# Patient Record
Sex: FEMALE | Race: WHITE
[De-identification: names, ages, dates, MRNs, and addresses within clinical notes are randomized per-mention and may not be internally consistent; named-entity substitution may affect disease eponyms.]

---

## 2019-08-28 ENCOUNTER — HOSPITAL ENCOUNTER (OUTPATIENT)
Dept: HOSPITAL 25 - OREAST | Age: 69
Discharge: HOME | End: 2019-08-28
Attending: SPECIALIST
Payer: MEDICARE

## 2019-08-28 VITALS — SYSTOLIC BLOOD PRESSURE: 136 MMHG | DIASTOLIC BLOOD PRESSURE: 69 MMHG

## 2019-08-28 DIAGNOSIS — E78.00: ICD-10-CM

## 2019-08-28 DIAGNOSIS — H25.811: Primary | ICD-10-CM

## 2019-08-28 DIAGNOSIS — H40.1131: ICD-10-CM

## 2019-08-28 DIAGNOSIS — I10: ICD-10-CM

## 2019-08-28 DIAGNOSIS — H40.033: ICD-10-CM

## 2019-08-28 DIAGNOSIS — H52.201: ICD-10-CM

## 2019-08-28 DIAGNOSIS — M06.9: ICD-10-CM

## 2019-08-28 PROCEDURE — V2632 POST CHMBR INTRAOCULAR LENS: HCPCS

## 2019-08-28 PROCEDURE — C1783 OCULAR IMP, AQUEOUS DRAIN DE: HCPCS

## 2019-08-28 NOTE — OP
OPERATIVE NOTE:

 

DATE OF OPERATION:  08/28/19

 

DATE OF BIRTH:  [birth year]

 

SURGEON:  Zay Tapia M.D.

 

PREOPERATIVE DIAGNOSIS:  Cataract and glaucoma, right eye.

 

POSTOPERATIVE DIAGNOSIS:  Cataract and glaucoma, right eye.

 

OPERATIVE PROCEDURE:  Extracapsular cataract extraction with intraocular lens implant and iStent, rig
ht eye.

 

PROCEDURE:  The patient was brought to the operating room after being given 1/2% Alcaine with epineph
rine drops in the preoperative area.  The eye was prepped and draped in the usual sterile fashion.  S
terile drape and eyelid speculum were placed.  Again, topical 1/2% Alcaine with epinephrine was given
.  A paracentesis incision was made at the 9 o'clock position with the No.75 blade.  Clear cornea inc
ision 2.2 x 2.2-mm was created at the 12 o'clock position starting at the anterior limbus using the 2
.2-mm keratome.  The anterior chamber was irrigated with 0.4 mL of 1% non-preservative intracameral l
idocaine and filled with DisCoVisc.  A capsulorrhexis was completed using the cystotome and the Utrat
a forceps. Hydrodissection was performed with balanced salt solution.  The lens nucleus was removed w
ith the Phacoemulsification handpiece without incident.  Cortex was removed with the irrigation-aspir
ation handpiece.  The capsular bag was re-inflated using DisCoVisc and an [id number] implant was inser
arlet with the shooter followed by an iStent inject placed at the 2 and 4 o'clock position with the brendan
oter.  The irrigation-aspiration handpiece was used to remove all residual DisCoVisc.  The eye was re
filled with balanced salt solution and the wound checked and found to be watertight.  Topical Maxitro
l drops were given.

 

 [id number]/CPS #: [id number]

## 2019-11-03 ENCOUNTER — HOSPITAL ENCOUNTER (EMERGENCY)
Dept: HOSPITAL 25 - ED | Age: 69
Discharge: HOME | End: 2019-11-03
Payer: MEDICARE

## 2019-11-03 VITALS — SYSTOLIC BLOOD PRESSURE: 144 MMHG | DIASTOLIC BLOOD PRESSURE: 88 MMHG

## 2019-11-03 DIAGNOSIS — J32.9: Primary | ICD-10-CM

## 2019-11-03 DIAGNOSIS — I10: ICD-10-CM

## 2019-11-03 PROCEDURE — 99282 EMERGENCY DEPT VISIT SF MDM: CPT

## 2019-11-03 PROCEDURE — 70486 CT MAXILLOFACIAL W/O DYE: CPT

## 2019-11-03 NOTE — ED
Neurological HPI





- HPI Summary


HPI Summary: 





Patient is a 68 y/o F presenting to the ED for a chief complaint of right-sided 

facial numbness and pain. Patient was sent to [facility] from [facility] for possible 

[place] Palsy or CVA.  Patient reports she has swelling on the right side of the 

face near the right eye, right-sided jaw pain, and right-sided dental pain. 

Patient previously believed she had a sinus infection due to nasal congestion 

and rhinorrhea. Patient denies pain while moving the eyes. Patient denies any 

aggravating or alleviating factors. Patient recently had an eye surgery in 

August 2019 for a PMHx of glaucoma. 





- History of Current Complaint


Chief Complaint: EDNeurologicalDeficit


Stated Complaint: RIGHT SIDE FACIAL NUMBNESS AND PAIN PER PT


Time Seen by Provider: 11/03/19 12:17


Hx Obtained From: Patient


Onset/Duration: Sudden Onset, Still Present


Timing: Sudden Onset


Onset Severity: Moderate


Current Severity: Moderate


Neurological Deficit Location: Facial


Headache Location: Diffuse (Right)


Pain Intensity: 2


Pain Scale Used: 0-10 Numeric


Character: Numbness/Tingling - Right-sided facial numbness, Other: - Positive 

right-sided facial swelling near right eye, right-sided jaw pain, right-sided 

dental pain, nasal congestion, rhinorrhea; negative pain while moving eyes


Aggravating: Nothing


Alleviating: Nothing


Associated Signs and Symptoms: Positive: Pain - Right-sided jaw and dental pain

, Numbness - Right-sided facial numbness





- Allergy/Home Medications


Allergies/Adverse Reactions: 


 Allergies











Allergy/AdvReac Type Severity Reaction Status Date / Time


 


No Known Allergies Allergy   Verified 08/28/19 07:08














PMH/Surg Hx/FS Hx/Imm Hx


Previously Healthy: Yes


Endocrine/Hematology History: 


   Denies: Hx Bone Marrow Disease, Hx Sickle Cell Disease, Hx Anemia


Cardiovascular History: Reports: Hx Hypertension


Musculoskeletal History: Reports: Hx Arthritis - OSTEO AND RA, Other 

Musculoskeletal History - BILATERAL TKR 2008


Sensory History: Reports: Hx Cataracts - BILATERAL, Hx Contacts or Glasses - 

GLASSES, Hx Glaucoma


   Denies: Hx Legally Blind, Hx Deafness, Hx Hearing Aid


Opthamlomology History: Reports: Hx Cataracts - BILATERAL, Hx Contacts or 

Glasses - GLASSES, Hx Glaucoma


   Denies: Hx Legally Blind


EENT History: 


   Denies: Hx Deafness





- Cancer History


Hx Chemotherapy: No


Hx Radiation Therapy: No





- Surgical History


Surgical History: Yes


Surgery Procedure, Year, and Place: RIGHT BREAST CA LUMPECTOMY 1966.  BILAT TKR 

2008 [place]


Hx Anesthesia Reactions: No


Infectious Disease History: No


Infectious Disease History: 


   Denies: Traveled Outside the [place] in Last 30 Days





- Family History


Known Family History: 


   Negative: Cardiac Disease





- Social History


Occupation: Retired


Lives: With Family


Alcohol Use: None


Hx Substance Use: No


Substance Use Type: Reports: None


Hx Tobacco Use: No


Smoking Status (MU): Never Smoked Tobacco


Have You Smoked in the Last Year: No





Review of Systems


Positive: Other - Negative pain while moving the eyes


Positive: Dental Pain - Right-sided, Nasal Discharge, Other - Positive right-

sided jaw pain and nasal congestion


Positive: Myalgia - Right-sided facial, Edema - Right side of the face near the 

right eye


Positive: Numbness


All Other Systems Reviewed And Are Negative: Yes





Physical Exam





- Summary


Physical Exam Summary: 





Appearance: The patient is well-nourished in no acute distress and in no acute 

pain.





Skin: The skin is warm and dry, and skin color reflects adequate perfusion.





HEENT: The head is normocephalic and atraumatic. The pupils are equal and 

reactive. The conjunctivae are clear and without drainage. Nares are patent and 

without drainage. Mouth reveals moist mucous membranes, and the throat is 

without erythema and exudate. The external ears are intact. The ear canals are 

patent and without drainage. The tympanic membranes are intact. Mild 

infraorbital swelling with mild tenderness, mild erythema, at rest there is 

some loss of the facial highlights on the right side, but patient can move 

voluntarily. 





Neck: The neck is supple with full range of motion and non-tender. There are no 

carotid bruits. There is no neck vein distension.





Respiratory: Chest is non-tender. Lungs are clear to auscultation and breath 

sounds are symmetrical and equal.





Cardiovascular: Heart is regular rate and rhythm. There is no murmur or rub 

auscultated. There is no peripheral edema and pulses are symmetrical and equal.





Abdomen: The abdomen is soft and non-tender. There are normal bowel sounds 

heard in all four quadrants and there is no organomegaly palpated.





Musculoskeletal: There is no back tenderness noted. Extremities are non-tender 

with full range of motion. There is good capillary refill. There is no 

peripheral edema or calf tenderness elicited.





Neurological: Patient is alert and oriented to person, place and time. The 

patient has symmetrical motor strength in all four extremities. Cranial nerves 

are grossly intact. Deep tendon reflexes are symmetrical and equal in all four 

extremities.





Psychiatric: The patient has an appropriate affect and does not exhibit any 

anxiety or depression.


Triage Information Reviewed: Yes


Vital Signs On Initial Exam: 


 Initial Vitals











Temp Pulse Resp BP Pulse Ox


 


 98.5 F   60   16   182/71   98 


 


 11/03/19 12:01  11/03/19 12:01  11/03/19 12:01  11/03/19 12:01  11/03/19 12:01











Vital Signs Reviewed: Yes





Procedures





- Sedation


Patient Received Moderate/Deep Sedation with Procedure: No





Diagnostics





- Vital Signs


 Vital Signs











  Temp Pulse Resp BP Pulse Ox


 


 11/03/19 12:01  98.5 F  60  16  182/71  98














- Laboratory


Lab Statement: Any lab studies that have been ordered have been reviewed, and 

results considered in the medical decision making process.





- CT


  ** Maxillofacial CT


CT Interpretation Completed By: Radiologist


Summary of CT Findings: Maxillofacial CT IMPRESSION:  1.  No maxillofacial 

fracture is identified.  2.  Mild bilateral maxillary sinus mucosal disease.  

Reviewed by ED physician.





Course/Dx





- Course


Course Of Treatment: Ms. Lara was sent over from [facility] with a concern for 

right facial droop Bell's palsy versus stroke.  Ms. Lara reports feeling she's 

had sinusitis for a few days with facial pain and now some swelling.  She was 

nontoxic in appearance with stable vitals.  On exam she had some mild swelling 

infraorbital area which partially obscured the lines of her face and related 

appears as though it was drooping.  She could move it well and there were no 

other focal neurological findings.  She had a friend with her in the department 

who felt that her face looked normal to him.  Maxillofacial CT did show 

sinusitis and I'm going to treat her with antibiotics and antihistamine/

decongestant.





- Diagnoses


Provider Diagnoses: 


 Sinusitis








Discharge ED





- Sign-Out/Discharge


Documenting (check all that apply): Patient Departure - Discharge





- Discharge Plan


Condition: Stable


Disposition: HOME


Prescriptions: 


Amoxicillin/Clavulanate TAB* [Augmentin *] 875 mg PO BID #20 tab


Fexofenadine/Pseudoephedrine [Allegra-D 24 Hour Tablet] 1 each PO DAILY #10 

tab.er.24h MDD 1


Patient Education Materials:  Sinusitis (ED)


Referrals: 


Manjinder House MD [Primary Care Provider] - 


Additional Instructions: 


Follow up with your primary care provider if not improved in 2-3 days. Return 

to the Emergency Department for new or worsening symptoms. 





- Billing Disposition and Condition


Condition: STABLE


Disposition: Home





- Attestation Statements


Document Initiated by Scribe: Yes


Documenting Scribe: Karine Mathur


Provider For Whom Scribe is Documenting (Include Credential): Caleb Adams MD


Scribe Attestation: 


Karine ROMAN, scribed for Caleb Adams MD on 11/03/19 at 1553. 


Scribe Documentation Reviewed: Yes


Provider Attestation: 


The documentation as recorded by the Karine hernandez accurately reflects 

the service I personally performed and the decisions made by me, Caleb Adams MD


Status of Scribe Document: Viewed

## 2019-11-03 NOTE — XMS REPORT
Continuity of Care Document (CCD)

 Created on:2019



Patient:Charlotte Lara

Sex:Female

:1950

External Reference #:[id number]





Demographics







 Address  [address] [place] [address]

 

 Home Phone  [phone]

 

 Mobile Phone  [phone]

 

 Preferred Language  en

 

 Marital Status  Not  or 

 

 Sabianist Affiliation  Unknown

 

 Race  White

 

 Ethnic Group  Not  or 









Author







 Name  Zay Tapia M.D.

 

 Address  [address] [place] [address]









Care Team Providers







 Name  Role  Phone

 

 Manjinder House M.D. - Family Medicine  Care Team Information   [phone]
[phone]

 

 Tomas Gaston O.D. - Optometrist  Care Team Information   
Unavailable









Problems







 Active Problems  Provider  Date

 

 Essential hypertension    Onset: 

 

 Glaucoma    Onset: 

 

 Hypercholesterolemia    Onset: 

 

 Bilateral primary open angle glaucoma  Zay Tapia M.D.  Onset: 2018

 

 Combined form of senile cataract  Zay Tapia M.D.  Onset: 2018

 

 History of herpes zoster    Onset: 









 Note: (HAD 3 OCCURRENCES OF SHINGLES)









 Myopia  Jes Lei O.D.  Onset: 2019

 

 Regular astigmatism  Jes Lei O.D.  Onset: 2019

 

 Presbyopia  Jes Lei O.D.  Onset: 2019

 

 Tear film insufficiency  Zay Tapia M.D.  Onset: 2019

 

 Superficial punctate keratitis  Sveta Jhaveri O.D.  Onset: 2019

 

 Chalazion  Sveta Jhaveri O.D.  Onset: 2019

 

 Presence of intraocular lens  Zay Tapia M.D.  Onset: 2019

 

 Bilateral narrow angle of anterior chamber  Zay Tapia M.D.  Onset: 



 of eyes    







Social History







 Type  Date  Description  Comments

 

 Birth Sex    Unknown  

 

 ETOH Use    Denies alcohol use  

 

 Tobacco Use  Start: Unknown  Patient has never smoked  

 

 Recreational Drug Use    Denies Drug Use  

 

 Smoking Status  Reviewed: 19  Patient has never smoked  







Allergies, Adverse Reactions, Alerts







 Active Allergies  Reaction  Severity  Comments  Date

 

 NKDA        2018

 

 Hay Fever        2018

 

 Pollen        2018







Medications







 Active Medications  SIG  Qnty  Indications  Ordering Provider  Date

 

 Travatan Z  1 drop both eyes  15ml  H40.1131  Zay Tapia,  2019



         0.004%  every night      M.D.  



 Solution          



           

 

 Timolol Maleate  One drop in both  15ml  H40.1131  Zay Tapia,  2019



              0.5%  eyes every      M.D.  



 (Daily) Solution  morning        



           

 

 Simvastatin        Midura, Manjinder M.D.  



          40mg Tablets          



           

 

 Atenolol        Midura, Manjinder M.D.  



       50mg Tablets          



           

 

 Triamterene/Hydrochlor        Midura, Manjinder M.D.  



 othiazide          



        37.5-25mg          



 Tablets          



           

 

 Calcium 500/Vitamin D  1 Tab PO Daily      Unknown  



           



 Tablets          



           

 

 Ervin Aspirin Ec Low        Unknown  



 Dose          



   81mg Tablets DR          



           

 

 Cool Compress  as Needed      Zay Tapia,  



         M.DKatia  

 

 Soothe XP  1 drop both eyes      Zay Tapia,  



         Solution  every hour      M.D.  



           









 History Medications









 Art[facility] T[facility]  [facility]



                1.4%  day      Shakila O.DKatia  2019



 Solution          



           

 

 Warm Compresses  every hour      Sveta J.  2019 -



         Shakila O.DKatia  2019

 

 Erythromycin  apply thin strip  1Tube  H00.15  Sveta J.  2019 -



            5mg/GM  to all four      Shakila O.JA  2019



 Ointment  eyelid margins x        



   every night for 2        



   weeks        

 

 Ciprofloxacin HCL  instill one drop  10units    Zay Tapia,  2019 -



                 0.3%  in the left eye      M.D.  2019



 Solution  three times a        



   day, start the        



   day before        



   surgery        

 

 Ketorolac  use one drop in  10ml    Zay Tapia,  2019 -



 Tromethamine  the left eye      M.D.  2019



            0.5%  three times a        



 Solution  day, start the        



   day before        



   surgery        

 

 Prednisolone Acetate  1 drops left eye  10ml    Zay Tapia,  2019 -



                    1%  three times a      M.D.  2019



 Suspension  day. taper as        



   directed        







Immunizations







 Description

 

 No Information Available







Vital Signs







 Date  Vital  Result  Comment

 

 2018  2:12pm  BP Systolic  125 mmHg  









 BP Diastolic  70 mmHg  

 

 Heart Rate  78 /min  

 

 Respiratory Rate  15 /min  









 2018  2:10pm  BP Systolic  142 mmHg  









 BP Diastolic  72 mmHg  

 

 Heart Rate  66 /min  

 

 Respiratory Rate  16 /min  







Results







 Description

 

 No Information Available







Procedures







 Date  Code  Description  Status

 

 2019  60262  Contact Lens For Treatment Of Ocular Surface Disease  
Completed

 

 2019  22770  Extracapsular Cataract Extraction W/Intraocular Lens  
Completed

 

 2019  0191T  I-Stent Aqueous Drainage Device  Completed

 

 2019  13157  Extracapsular Cataract Extraction W/Intraocular Lens  
Completed

 

 2019  0191T  I-Stent Aqueous Drainage Device  Completed

 

 2019  59540  Ophthalmic Biometry  Completed

 

 2019  33269  Ophthalmic Biometry  Completed

 

 2019  66789  Est Patient Intermediate Exam  Completed

 

 2019  79241  Gonioscopy  Completed

 

 2019  59328  Est Patient Intermediate Exam  Completed

 

 2019  55247  Scanning Computerized Ophthalmic Diagnostic Imag Posterior  
Completed



     Seg On  

 

 2019  80161  Visual Field Exam Extended  Completed

 

 2019  99078  Est Patient Comprehensive Exam  Completed







Medical Devices







 Description

 

 No Information Available







Encounters







 Type  Date  Location  Provider  Dx  Diagnosis

 

 Office Visit  2019  Sveta Henderson  H16.143  Punctate



   8:15a  MD, pc  TENNILLE Jhaveri    keratitis,



           bilateral









 H04.123  Dry eye syndrome of bilateral lacrimal glands

 

 Z96.1  Presence of intraocular lens

 

 H40.1131  Primary open-angle glaucoma, bilateral, mild stage







Assessments







 Date  Code  Description  Provider

 

 2019  H16.143  Punctate keratitis, bilateral  Zay Tapia M.D.

 

 2019  H04.123  Dry eye syndrome of bilateral lacrimal  Zay Tapia M.D.



     glands  

 

 2019  Z96.1  Presence of intraocular lens  Zay Tapia M.D.

 

 2019  H16.143  Punctate keratitis, bilateral  Sveta Jhaveri O.D.

 

 2019  H04.123  Dry eye syndrome of bilateral lacrimal  Sveta Jhaveri O.D.



     glands  

 

 2019  Z96.1  Presence of intraocular lens  Sveta Jhaveri O.D.

 

 2019  H40.1131  Primary open-angle glaucoma, bilateral,  Sveta Jhaveri O.D.



     mild stage  

 

 2019  Z96.1  Presence of intraocular lens  Sveta Jhaveri O.D.

 

 2019  H40.1131  Primary open-angle glaucoma, bilateral,  Sveta Jhaveri O.D.



     mild stage  

 

 2019  H00.15  Chalazion left lower eyelid  Sveta Jhaveri O.D.

 

 2019  H16.142  Punctate keratitis, left eye  Sveta Jhaveri O.D.

 

 2019  Z96.1  Presence of intraocular lens  Zay Tapia M.D.

 

 2019  H40.033  Anatomical narrow angle, bilateral  Zay Tapia M.D.

 

 2019  H40.1131  Primary open-angle glaucoma, bilateral,  Zay Tapia M.D.



     mild stage  

 

 2019  H25.811  Combined forms of age-related cataract,  Zay Tapia M.D.



     right eye  

 

 2019  H40.1111  Primary open-angle glaucoma, right eye,  Zay Tapia M.D.



     mild stage  

 

 2019  H25.811  Combined forms of age-related cataract,  Zay Tapia M.D.



     right eye  

 

 2019  H40.1131  Primary open-angle glaucoma, bilateral,  Zay Tapia M.D.



     mild stage  

 

 2019  H40.033  Anatomical narrow angle, bilateral  Zay Tapia M.D.

 

 2019  Z96.1  Presence of intraocular lens  Zay Tapia M.D.

 

 2019  H25.812  Combined forms of age-related cataract,  Zay Tapia M.D.



     left eye  

 

 2019  H40.1121  Primary open-angle glaucoma, left eye,  Zay Tapia M.D.



     mild stage  

 

 2019  H25.812  Combined forms of age-related cataract,  Zay Tapia M.D.



     left eye  

 

 2019  H25.812  Combined forms of age-related cataract,  Zay Tapia M.D.



     left eye  

 

 2019  H40.1131  Primary open-angle glaucoma, bilateral,  Zay Tapia M.D.



     mild stage  

 

 2019  H40.033  Anatomical narrow angle, bilateral  Zay Tapia M.D.

 

 2019  H25.811  Combined forms of age-related cataract,  Zay Tapia M.D.



     right eye  

 

 2019  H40.1131  Primary open-angle glaucoma, bilateral,  Zay Tapia M.D.



     mild stage  

 

 2019  H40.033  Anatomical narrow angle, bilateral  Zay Tapia M.D.

 

 2019  H25.813  Combined forms of age-related cataract,  Zay Tapia M.D.



     bilateral  

 

 2019  H40.1131  Primary open-angle glaucoma, bilateral,  Zay Tapia M.D.



     mild stage  

 

 2019  H25.813  Combined forms of age-related cataract,  Zay Tapia M.D.



     bilateral  







Plan of Treatment

Future Appointment(s):2020  8:45 am - Zay Tapia M.D. at Zay Tapia MD, 2019 - Zay Tapia M.D.H16.143 Punctate keratitis, 
bilateralComments:Smoking can increase the risk of developing or worsening any 
eye related disease, as well as affect your overall health.  If you are a smoker
, we strongly recommend that you quit.If you are not a smoker, we strongly 
recommend that you do not start.  DISCONTINUE:Travatan Z  Timolol MaleateBEGIN 
USING SYSTANE SEVERAL TIMES A DAY AND REFRESH PM AT BEDTIMEFollow up:
.123 Dry eye syndrome of bilateral lacrimal glandsComments:Both of your 
eyes appear to be dry. Use artificial tears as directed.  You can use the tears 
more often if you are reading a book or are on the computer, as we tend to 
blink less, making our eyes dry out more.[facility] offers a few 
items in our optical department to help alleviate dry eye symptoms. Systane and 
Refresh are good brands of tears you can use.  You can pick these up at any 
pharmacy and they do not require a prescription.Z96.1 Presence of intraocular 
lensComments:The artificial lens implants in both eyes appear to be stable at 
this time.



Functional Status







 Description

 

 No Information Available







Mental Status







 Description

 

 No Information Available







Referrals







 Description

 

 No Information Available

## 2019-11-03 NOTE — XMS REPORT
Continuity of Care Document (CCD)

 Created on:2019



Patient:Charlotte Lara

Sex:Female

:1950

External Reference #:[id number]





Demographics







 Address  [address] [place] [address]

 

 Home Phone  [phone]

 

 Mobile Phone  [phone]

 

 Preferred Language  en

 

 Marital Status  Not  or 

 

 Restorationist Affiliation  Unknown

 

 Race  White

 

 Ethnic Group  Not  or 









Author







 Name  Sveta Jhaveri O.D.

 

 Address  [address] [place] [address]









Care Team Providers







 Name  Role  Phone

 

 Manjinder House M.D. - Family Medicine  Care Team Information   [phone]
[phone]

 

 Tomas Gaston O.D. - Optometrist  Care Team Information   
Unavailable









Problems







 Active Problems  Provider  Date

 

 Essential hypertension    Onset: 

 

 Glaucoma    Onset: 

 

 Hypercholesterolemia    Onset: 

 

 Bilateral primary open angle glaucoma  Zay Tapia M.D.  Onset: 2018

 

 Combined form of senile cataract  Zay Tapia M.D.  Onset: 2018

 

 History of herpes zoster    Onset: 









 Note: (HAD 3 OCCURRENCES OF SHINGLES)









 Myopia  Jes Lei O.D.  Onset: 2019

 

 Regular astigmatism  Jes Lei O.D.  Onset: 2019

 

 Presbyopia  Jes Lei O.D.  Onset: 2019

 

 Superficial punctate keratitis  Sveta Jhaveri O.D.  Onset: 2019

 

 Chalazion  Sveta Jhaveri O.D.  Onset: 2019

 

 Presence of intraocular lens  Zay Tapia M.D.  Onset: 2019

 

 Bilateral narrow angle of anterior chamber  Zay Tapia M.D.  Onset: 



 of eyes    







Social History







 Type  Date  Description  Comments

 

 Birth Sex    Unknown  

 

 ETOH Use    Denies alcohol use  

 

 Tobacco Use  Start: Unknown  Patient has never smoked  

 

 Recreational Drug Use    Denies Drug Use  

 

 Smoking Status  Reviewed: 19  Patient has never smoked  







Allergies, Adverse Reactions, Alerts







 Active Allergies  Reaction  Severity  Comments  Date

 

 NKDA        2018

 

 Hay Fever        2018

 

 Pollen        2018







Medications







 Active Medications  SIG  Qnty  Indications  Ordering  Date



         Provider  

 

 Artificial Tears  throughout the day      Sveta MCDONALD  2019



                1.4%        TENNILLE Jhaveri  



 Solution          



           

 

 Warm Compresses  every hour      Sveta MCDONALD  2019



         TENNILLE Jhaveri  

 

 Erythromycin  apply thin strip to  1Tube  H00.15  Sveta MCDONALD  2019



            5mg/GM  all four eyelid      TENNILLE Jhaveri  



 Ointment  margins x every        



   night for 2 weeks        

 

 Travatan Z  1 drop both eyes  15ml  H40.1131  Zay Tapia,  2019



          0.004%  every night      M.DKatia  



 Solution          



           

 

 Timolol Maleate  One drop in both  15ml  H40.1131  Zay Tapia,  2019



               0.5%  eyes every morning      M.D.  



 (Daily) Solution          



           

 

 Simvastatin        Midura, Manjinder  



           40mg        M.D.  



 Tablets          



           

 

 Atenolol        Midura, Manjinder  



        50mg Tablets        M.D.  



           

 

 Triamterene/Hydrochlo        Midura, Manjinder  



 rothiazide        M.D.  



          37.5-25mg          



 Tablets          



           

 

 Calcium 500/Vitamin D  1 Tab PO Daily      Unknown  



           



 Tablets          



           

 

 Ervin Aspirin Ec Low        Unknown  



 Dose          



    81mg Tablets DR          



           









 History Medications









 Ciprofloxacin HCL  instill one drop  10units    Zay MCDONALD  2019 -



               0.3%  in the left eye      IRMA Tapia  2019



 Solution  three times a day,        



   start the day        



   before surgery        

 

 Ketorolac Tromethamine  use one drop in  10ml    Zay MCDONALD  2019 -



                    0.5%  the left eye three      IRMA Tapia  2019



 Solution  times a day, start        



   the day before        



   surgery        

 

 Prednisolone Acetate  1 drops left eye  10ml    Zay MCDONALD  2019 -



                  1%  three times a day.      IRMA Tapia  2019



 Suspension  taper as directed        



           







Immunizations







 Description

 

 No Information Available







Vital Signs







 Date  Vital  Result  Comment

 

 2018  2:12pm  BP Systolic  125 mmHg  









 BP Diastolic  70 mmHg  

 

 Heart Rate  78 /min  

 

 Respiratory Rate  15 /min  









 2018  2:10pm  BP Systolic  142 mmHg  









 BP Diastolic  72 mmHg  

 

 Heart Rate  66 /min  

 

 Respiratory Rate  16 /min  







Results







 Description

 

 No Information Available







Procedures







 Date  Code  Description  Status

 

 2019  76948  Extracapsular Cataract Extraction W/Intraocular Lens  
Completed

 

 2019  0191T  I-Stent Aqueous Drainage Device  Completed

 

 2019  40013  Extracapsular Cataract Extraction W/Intraocular Lens  
Completed

 

 2019  0191T  I-Stent Aqueous Drainage Device  Completed

 

 2019  32985  Ophthalmic Biometry  Completed

 

 2019  22200  Ophthalmic Biometry  Completed

 

 2019  40100  Est Patient Intermediate Exam  Completed

 

 2019  66435  Gonioscopy  Completed

 

 2019  71058  Est Patient Intermediate Exam  Completed

 

 2019  92959  Scanning Computerized Ophthalmic Diagnostic Imag Posterior  
Completed



     Seg On  

 

 2019  23917  Visual Field Exam Extended  Completed

 

 2019  02382  Est Patient Comprehensive Exam  Completed







Medical Devices







 Description

 

 No Information Available







Encounters







 Description

 

 No Information Available







Assessments







 Date  Code  Description  Provider

 

 2019  H16.143  Punctate keratitis, bilateral  Sveta Jhaveri O.D.

 

 2019  H04.123  Dry eye syndrome of bilateral lacrimal  Sveta Jhaveri O.D.



     glands  

 

 2019  Z96.1  Presence of intraocular lens  Sveta Jhaveri O.D.

 

 2019  H40.1131  Primary open-angle glaucoma, bilateral,  Sveta Jhaveri O.D.



     mild stage  

 

 2019  Z96.1  Presence of intraocular lens  Sveta Jhaveri O.D.

 

 2019  H40.1131  Primary open-angle glaucoma, bilateral,  Sveta Jhaveri O.D.



     mild stage  

 

 2019  H00.15  Chalazion left lower eyelid  Sveta Jhaveri O.D.

 

 2019  H16.142  Punctate keratitis, left eye  Sveta Jhaveri O.D.

 

 2019  Z96.1  Presence of intraocular lens  Zay Tapia M.D.

 

 2019  H40.033  Anatomical narrow angle, bilateral  Zay Tapia M.D.

 

 2019  H40.1131  Primary open-angle glaucoma, bilateral,  Zay Tapia M.D.



     mild stage  

 

 2019  H25.811  Combined forms of age-related cataract,  Zay Tapia M.D.



     right eye  

 

 2019  H40.1111  Primary open-angle glaucoma, right eye,  Zay Tapia M.D.



     mild stage  

 

 2019  H25.811  Combined forms of age-related cataract,  Zay Tapia M.D.



     right eye  

 

 2019  H40.1131  Primary open-angle glaucoma, bilateral,  Zay Tapia M.D.



     mild stage  

 

 2019  H40.033  Anatomical narrow angle, bilateral  Zay Tapia M.D.

 

 2019  Z96.1  Presence of intraocular lens  Zay Tapia M.D.

 

 2019  H25.812  Combined forms of age-related cataract,  Zay Tapia M.D.



     left eye  

 

 2019  H40.1121  Primary open-angle glaucoma, left eye,  Zay Tapia M.D.



     mild stage  

 

 2019  H25.812  Combined forms of age-related cataract,  Zay Tapia M.D.



     left eye  

 

 2019  H25.812  Combined forms of age-related cataract,  Zay Tapia M.D.



     left eye  

 

 2019  H40.1131  Primary open-angle glaucoma, bilateral,  Zay Tapia M.D.



     mild stage  

 

 2019  H40.033  Anatomical narrow angle, bilateral  Zay Tapia M.D.

 

 2019  H25.811  Combined forms of age-related cataract,  Zay Tapia M.D.



     right eye  

 

 2019  H40.1131  Primary open-angle glaucoma, bilateral,  Zay Tapia M.D.



     mild stage  

 

 2019  H40.033  Anatomical narrow angle, bilateral  Zay Tapia M.D.

 

 2019  H25.813  Combined forms of age-related cataract,  Zay Tapia M.D.



     bilateral  

 

 2019  H40.1131  Primary open-angle glaucoma, bilateral,  Zay Tapia M.D.



     mild stage  

 

 2019  H25.813  Combined forms of age-related cataract,  Zay Tapia M.D.



     bilateral  







Plan of Treatment

Future Appointment(s):2020  8:45 am - [facility] Tap[facility] M.D. at [facility] - Sveta Jhaveri O.D.H16.143 Punctate keratitis, 
bilateralComments:continue artificial tears both eyes every hour preservative 
free cold compress for comfortFollow up:1 Day Follow up with RA04.123 Dry eye 
syndrome of bilateral lacrimal phtfpuP73.1 Presence of intraocular lensH40.1131 
Primary open-angle glaucoma, bilateral, mild stage



Functional Status







 Description

 

 No Information Available







Mental Status







 Description

 

 No Information Available







Referrals







 Description

 

 No Information Available

## 2019-11-03 NOTE — XMS REPORT
Continuity of Care Document (CCD)

 Created on:2019



Patient:Charlotte Lara

Sex:Female

:1950

External Reference #:[id number]





Demographics







 Address  [address] [place] [address]

 

 Home Phone  [phone]

 

 Mobile Phone  [phone]

 

 Preferred Language  en

 

 Marital Status  Not  or 

 

 Sabianism Affiliation  Unknown

 

 Race  White

 

 Ethnic Group  Not  or 









Author







 Name  Sveta Jhaveri O.D.

 

 Address  [address] [place] [address]









Care Team Providers







 Name  Role  Phone

 

 Manjinder House M.D. - Family Medicine  Care Team Information   [phone]
[phone]

 

 Tomas Gaston O.D. - Optometrist  Care Team Information   
Unavailable









Problems







 Active Problems  Provider  Date

 

 Essential hypertension    Onset: 

 

 Glaucoma    Onset: 

 

 Hypercholesterolemia    Onset: 

 

 Bilateral primary open angle glaucoma  Zay Tapia M.D.  Onset: 2018

 

 Combined form of senile cataract  Zay Tapia M.D.  Onset: 2018

 

 History of herpes zoster    Onset: 









 Note: (HAD 3 OCCURRENCES OF SHINGLES)









 Myopia  Jes Lei O.D.  Onset: 2019

 

 Regular astigmatism  Jes Lei O.D.  Onset: 2019

 

 Presbyopia  Jes Lei O.D.  Onset: 2019

 

 Superficial punctate keratitis  Sveta Jhaveri O.D.  Onset: 2019

 

 Chalazion  Sveta Jhaveri O.D.  Onset: 2019

 

 Presence of intraocular lens  Zay Tapia M.D.  Onset: 2019

 

 Bilateral narrow angle of anterior chamber  Zay Tapia M.D.  Onset: 



 of eyes    







Social History







 Type  Date  Description  Comments

 

 Birth Sex    Unknown  

 

 ETOH Use    Denies alcohol use  

 

 Tobacco Use  Start: Unknown  Patient has never smoked  

 

 Recreational Drug Use    Denies Drug Use  

 

 Smoking Status  Reviewed: 19  Patient has never smoked  







Allergies, Adverse Reactions, Alerts







 Active Allergies  Reaction  Severity  Comments  Date

 

 NKDA        2018

 

 Hay Fever        2018

 

 Pollen        2018







Medications







 Active Medications  SIG  Qnty  Indications  Ordering Provider  Date

 

 Erythromycin  apply thin strip  1Tube  H00.15  Sveta MCDONALD  2019



           5mg/GM  to all four      Stockwin, O.D.  



 Ointment  eyelid margins x        



   every night for 2        



   weeks        

 

 Ketorolac Tromethamine  use one drop in  10ml    Zay Tapia,  2019



   the left eye      M.D.  



 0.5% Solution  three times a        



   day, start the        



   day before        



   surgery        

 

 Prednisolone Acetate  1 drops left eye  10ml    Zay Tapia,  2019



                   1%  three times a      M.D.  



 Suspension  day. taper as        



   directed        

 

 Travatan Z  1 drop both eyes  15ml  H40.1131  Zay Tapia,  2019



         0.004%  every night      M.D.  



 Solution          



           

 

 Timolol Maleate  One drop in both  15ml  H40.1131  Zay Tapia,  2019



              0.5%  eyes every      M.D.  



 (Daily) Solution  morning        



           

 

 Simvastatin        Midura, Manjinder M.D.  



          40mg Tablets          



           

 

 Atenolol        Midura, Manjinder M.D.  



       50mg Tablets          



           

 

 Triamterene/Hydrochlor        Midura, Manjinder M.D.  



 othiazide          



        37.5-25mg          



 Tablets          



           

 

 Calcium 500/Vitamin D  1 Tab PO Daily      Unknown  



           



 Tablets          



           

 

 Ervin Aspirin Ec Low        Unknown  



 Dose          



   81mg Tablets DR          



           









 History Medications









 Ciprofloxacin HCL  instill one drop  10units    Zay Tapia,  2019 -



               0.3%  in the left eye      M.D.  2019



 Solution  three times a        



   day, start the        



   day before        



   surgery        







Immunizations







 Description

 

 No Information Available







Vital Signs







 Date  Vital  Result  Comment

 

 2018  2:12pm  BP Systolic  125 mmHg  









 BP Diastolic  70 mmHg  

 

 Heart Rate  78 /min  

 

 Respiratory Rate  15 /min  









 2018  2:10pm  BP Systolic  142 mmHg  









 BP Diastolic  72 mmHg  

 

 Heart Rate  66 /min  

 

 Respiratory Rate  16 /min  







Results







 Description

 

 No Information Available







Procedures







 Date  Code  Description  Status

 

 2019  41150  Extracapsular Cataract Extraction W/Intraocular Lens  
Completed

 

 2019  0191T  I-Stent Aqueous Drainage Device  Completed

 

 2019  40074  Extracapsular Cataract Extraction W/Intraocular Lens  
Completed

 

 2019  0191T  I-Stent Aqueous Drainage Device  Completed

 

 2019  60419  Ophthalmic Biometry  Completed

 

 2019  74163  Ophthalmic Biometry  Completed

 

 2019  99693  Est Patient Intermediate Exam  Completed

 

 2019  93229  Gonioscopy  Completed

 

 2019  59008  Est Patient Intermediate Exam  Completed

 

 2019  13621  Scanning Computerized Ophthalmic Diagnostic Imag Posterior  
Completed



     Seg On  

 

 2019  82885  Visual Field Exam Extended  Completed

 

 2019  19027  Est Patient Comprehensive Exam  Completed







Medical Devices







 Description

 

 No Information Available







Encounters







 Description

 

 No Information Available







Assessments







 Date  Code  Description  Provider

 

 2019  Z96.1  Presence of intraocular lens  Sveta Jhaveri O.D.

 

 2019  H40.1131  Primary open-angle glaucoma, bilateral,  Sveta Jhaveri O.D.



     mild stage  

 

 2019  H00.15  Chalazion left lower eyelid  Sveta Jhaveri O.D.

 

 2019  H16.142  Punctate keratitis, left eye  Sveta Jhaveri O.D.

 

 2019  Z96.1  Presence of intraocular lens  Zay Tapia M.D.

 

 2019  H40.033  Anatomical narrow angle, bilateral  Zay Tapia M.D.

 

 2019  H40.1131  Primary open-angle glaucoma, bilateral,  Zay Tapia M.D.



     mild stage  

 

 2019  H25.811  Combined forms of age-related cataract,  Zay Tapia M.D.



     right eye  

 

 2019  H40.1111  Primary open-angle glaucoma, right eye,  Zay Tapia M.D.



     mild stage  

 

 2019  H25.811  Combined forms of age-related cataract,  Zay Tapia M.D.



     right eye  

 

 2019  H40.1131  Primary open-angle glaucoma, bilateral,  Zay Tapia M.D.



     mild stage  

 

 2019  H40.033  Anatomical narrow angle, bilateral  Zay Tapia M.D.

 

 2019  Z96.1  Presence of intraocular lens  Zay Tapia M.D.

 

 2019  H25.812  Combined forms of age-related cataract,  Zay Tapia M.D.



     left eye  

 

 2019  H40.1121  Primary open-angle glaucoma, left eye,  Zay Tapia M.D.



     mild stage  

 

 2019  H25.812  Combined forms of age-related cataract,  Zay Tapia M.D.



     left eye  

 

 2019  H25.812  Combined forms of age-related cataract,  Zay Tapia M.D.



     left eye  

 

 2019  H40.1131  Primary open-angle glaucoma, bilateral,  Zay Tapia M.D.



     mild stage  

 

 2019  H40.033  Anatomical narrow angle, bilateral  Zay Tapia M.D.

 

 2019  H25.811  Combined forms of age-related cataract,  Zay Tapia M.D.



     right eye  

 

 2019  H40.1131  Primary open-angle glaucoma, bilateral,  Zay Tapia M.D.



     mild stage  

 

 2019  H40.033  Anatomical narrow angle, bilateral  Zay Tapia M.D.

 

 2019  H25.813  Combined forms of age-related cataract,  Zay Tapia M.D.



     bilateral  

 

 2019  H40.1131  Primary open-angle glaucoma, bilateral,  Zay Tapia M.D.



     mild stage  

 

 2019  H25.813  Combined forms of age-related cataract,  Zay Tapia M.D.



     bilateral  







Plan of Treatment

Future Appointment(s):2020  8:45 am - [facility] M.D. at [facility] - Sveta Jhaveri O.D.Z96.1 Presence of intraocular 
lensComments:Your lens implant looks stable in both eyes at this time.  You 
should be done, or almost done with your drops at this time according to your 
surgical calendar.  I have given you a prescription for glasses.  If you have 
any questions, please feel free to call our office at [phone].H40.1131 
Primary open-angle glaucoma, bilateral, mild stageComments:Your glaucoma is 
stable at this time.Your eye pressure is within an acceptable range, and your 
testing does not show any Glaucoma related changes at this time. Please 
continue your treatment.H00.15 Chalazion left lower eyelidNew Medication:
Erythromycin 5 mg/GM - apply thin strip to all four eyelid margins x every 
night for 2 weeksComments:use hot compresses for 5-10 minutes 3-4x dayuse 
ointment on all lids at bedtimeuse artificial tears at least 3-4 times a day 
.Follow up:1-2 Week Follow Up recheck and MR NO DROPS/ NO IOP  At your next 
visit, we are not planning to dilate your eyes. However, if you have any 
changes in your vision or new symptoms, there are certain situations that 
require us to dilate your eyes. If Dr. Jhaveri requests any additional testing
, that may require extra time. If you have any questions before your next 
appointment, please call our office [phone].Z05.054 Punctate keratitis
, left eye



Functional Status







 Description

 

 No Information Available







Mental Status







 Description

 

 No Information Available







Referrals







 Description

 

 No Information Available

## 2019-11-03 NOTE — XMS REPORT
Continuity of Care Document (CCD)

 Created on:2019



Patient:Charlotte Lara

Sex:Female

:1950

External Reference #:[id number]





Demographics







 Address  [address] [place] [id number]

 

 Home Phone  [phone]

 

 Mobile Phone  [phone]

 

 Preferred Language  en

 

 Marital Status  Not  or 

 

 Baptism Affiliation  Unknown

 

 Race  White

 

 Ethnic Group  Not  or 









Author







 Name  Zay Tapia M.D.

 

 Address  [address] [place] [address]









Care Team Providers







 Name  Role  Phone

 

 Manjinder House M.D. - Family Medicine  Care Team Information   [phone]
[phone]

 

 Tomas Gaston O.D. - Optometrist  Care Team Information   
Unavailable









Problems







 Active Problems  Provider  Date

 

 Essential hypertension    Onset: 

 

 Glaucoma    Onset: 

 

 Hypercholesterolemia    Onset: 

 

 Bilateral primary open angle glaucoma  Zay Tapia M.D.  Onset: 2018

 

 Combined form of senile cataract  Zay Tapia M.D.  Onset: 2018

 

 History of herpes zoster    Onset: 









 Note: (HAD 3 OCCURRENCES OF SHINGLES)









 Myopia  Jes Lei O.D.  Onset: 2019

 

 Regular astigmatism  Jes Lei O.D.  Onset: 2019

 

 Presbyopia  Jes Lei O.D.  Onset: 2019

 

 Tear film insufficiency  Zay Tapia M.D.  Onset: 2019

 

 Superficial punctate keratitis  Sveta Jhaveri O.D.  Onset: 2019

 

 Chalazion  Sveta Jhaveri O.D.  Onset: 2019

 

 Presence of intraocular lens  Zay Tapia M.D.  Onset: 2019

 

 Bilateral narrow angle of anterior chamber  Zay Tapia M.D.  Onset: 



 of eyes    







Social History







 Type  Date  Description  Comments

 

 Birth Sex    Unknown  

 

 ETOH Use    Denies alcohol use  

 

 Tobacco Use  Start: Unknown  Patient has never smoked  

 

 Recreational Drug Use    Denies Drug Use  

 

 Smoking Status  Reviewed: 19  Patient has never smoked  







Allergies, Adverse Reactions, Alerts







 Active Allergies  Reaction  Severity  Comments  Date

 

 NKDA        2018

 

 Hay Fever        2018

 

 Pollen        2018







Medications







 Active Medications  SIG  Qnty  Indications  Ordering  Date



         Provider  

 

 Refresh P.M.  every night at  10.5gm    Zay Tapia,  2019



   bedtime both eyes      M.D.  



 Ointment  in the morning as        



   needed        

 

 Systane  throughout the day      Zay Tapia,  2019



        0.4-0.3%        M.D.  



 Solution          



           

 

 Simvastatin        [facility]  



            40mg        M.D.  



 Tablets          



           

 

 Atenolol        [facility], Manjinder  



         50mg Tablets        M.D.  



           

 

 Triamterene/Hydrochl        [facility], Manjinder  



 orothiazide        M.D.  



            37.5-25mg          



 Tablets          



           

 

 Calcium 500/Vitamin  1 Tab PO Daily      Unknown  



 D          



   Tablets          



           

 

 Ervin Aspirin Ec Low        Unknown  



 Dose          



     81mg Tablets DR          



           

 

 Cool Compress  as Needed      Zay Tapia,  



         M.DKatia  









 History Medications









 Artificial Tears  throughout the      Sveta MCDONALD  2019 -



                1.4%  day      TENNILLE Jhaveri  2019



 Solution          



           

 

 Warm Compresses  every hour      Sveta MCDONALD  2019 -



         TENNILLE Jhaveri  2019

 

 Erythromycin  apply thin strip  1Tube  H00.15  Sveta MCDONALD  2019 -



            5mg/GM  to all four      TENNILLE Jhaveri  2019



 Ointment  eyelid margins x        



   every night for 2        



   weeks        

 

 Ciprofloxacin HCL  instill one drop  10units    Zay Tapia,  2019 -



                 0.3%  in the left eye      M.D.  2019



 Solution  three times a        



   day, start the        



   day before        



   surgery        

 

 Ketorolac  use one drop in  10ml    Zay Tapia,  2019 -



 Tromethamine  the left eye      M.D.  2019



            0.5%  three times a        



 Solution  day, start the        



   day before        



   surgery        

 

 Prednisolone Acetate  1 drops left eye  10ml    Zay Tapia,  2019 -



                    1%  three times a      M.D.  2019



 Suspension  day. taper as        



   directed        

 

 Travatan Z  1 drop both eyes  15ml  H40.113  Zay Tapia,  2019 -



          0.004%  every night    1  M.DKatia  2019



 Solution          



           







Immunizations







 Description

 

 No Information Available







Vital Signs







 Date  Vital  Result  Comment

 

 2018  2:12pm  BP Systolic  125 mmHg  









 BP Diastolic  70 mmHg  

 

 Heart Rate  78 /min  

 

 Respiratory Rate  15 /min  









 2018  2:10pm  BP Systolic  142 mmHg  









 BP Diastolic  72 mmHg  

 

 Heart Rate  66 /min  

 

 Respiratory Rate  16 /min  







Results







 Description

 

 No Information Available







Procedures







 Date  Code  Description  Status

 

 2019  30076  Contact Lens For Treatment Of Ocular Surface Disease  
Completed

 

 2019  13317  Extracapsular Cataract Extraction W/Intraocular Lens  
Completed

 

 2019  0191T  I-Stent Aqueous Drainage Device  Completed

 

 2019  62704  Extracapsular Cataract Extraction W/Intraocular Lens  
Completed

 

 2019  0191T  I-Stent Aqueous Drainage Device  Completed

 

 2019  89542  Ophthalmic Biometry  Completed

 

 2019  11038  Ophthalmic Biometry  Completed

 

 2019  17695  Est Patient Intermediate Exam  Completed

 

 2019  98903  Gonioscopy  Completed

 

 2019  19092  Est Patient Intermediate Exam  Completed

 

 2019  96835  Scanning Computerized Ophthalmic Diagnostic Imag Posterior  
Completed



     Seg On  

 

 2019  09315  Visual Field Exam Extended  Completed

 

 2019  03969  Est Patient Comprehensive Exam  Completed







Medical Devices







 Description

 

 No Information Available







Encounters







 Type  Date  Location  Provider  Dx  Diagnosis

 

 Office Visit  2019  Sveta Henderson  H16.143  Punctate



   8:15a  MD, pc  TENNILLE Jhaveri    keratitis,



           bilateral









 H04.123  Dry eye syndrome of bilateral lacrimal glands

 

 Z96.1  Presence of intraocular lens

 

 H40.1131  Primary open-angle glaucoma, bilateral, mild stage







Assessments







 Date  Code  Description  Provider

 

 2019  H16.143  Punctate keratitis, bilateral  Zay Tapia M.D.

 

 2019  H40.1131  Primary open-angle glaucoma, bilateral,  Zay Tapia M.D.



     mild stage  

 

 2019  Z96.1  Presence of intraocular lens  Zay Tapia M.D.

 

 2019  H16.143  Punctate keratitis, bilateral  Zay Tapia M.D.

 

 2019  H04.123  Dry eye syndrome of bilateral lacrimal  Zay Tapia M.D.



     glands  

 

 2019  Z96.1  Presence of intraocular lens  Zay Tapia M.D.

 

 2019  H16.143  Punctate keratitis, bilateral  VANDANA Lee.RYAN.

 

 2019  H04.123  Dry eye syndrome of bilateral lacrimal  Sveta Jhaveri O.D.



     glands  

 

 2019  Z96.1  Presence of intraocular lens  VANDANA Lee.JA

 

 2019  H40.1131  Primary open-angle glaucoma, bilateral,  Sveta Jhaveri O.D.



     mild stage  

 

 2019  Z96.1  Presence of intraocular lens  Sveta Jhaveri O.D.

 

 2019  H40.1131  Primary open-angle glaucoma, bilateral,  Sveta Jhaveri O.D.



     mild stage  

 

 2019  H00.15  Chalazion left lower eyelid  Sveta Jhaveri O.D.

 

 2019  H16.142  Punctate keratitis, left eye  VANDANA Lee.RYAN.

 

 2019  Z96.1  Presence of intraocular lens  Zay Tapia M.D.

 

 2019  H40.033  Anatomical narrow angle, bilateral  Zay Tapia M.D.

 

 2019  H40.1131  Primary open-angle glaucoma, bilateral,  Zay Tapia M.D.



     mild stage  

 

 2019  H25.811  Combined forms of age-related cataract,  Zay Tapia M.D.



     right eye  

 

 2019  H40.1111  Primary open-angle glaucoma, right eye,  Zay Tapia M.D.



     mild stage  

 

 2019  H25.811  Combined forms of age-related cataract,  Zay Tapia M.D.



     right eye  

 

 2019  H40.1131  Primary open-angle glaucoma, bilateral,  Zay Tapia M.D.



     mild stage  

 

 2019  H40.033  Anatomical narrow angle, bilateral  Zay Tapia M.D.

 

 2019  Z96.1  Presence of intraocular lens  Zay Tapia M.D.

 

 2019  H25.812  Combined forms of age-related cataract,  Zay Tapia M.D.



     left eye  

 

 2019  H40.1121  Primary open-angle glaucoma, left eye,  Zay Tapia M.D.



     mild stage  

 

 2019  H25.812  Combined forms of age-related cataract,  Zay Tapia M.D.



     left eye  

 

 2019  H25.812  Combined forms of age-related cataract,  Zay Tapia M.D.



     left eye  

 

 2019  H40.1131  Primary open-angle glaucoma, bilateral,  Zay Tapia M.D.



     mild stage  

 

 2019  H40.033  Anatomical narrow angle, bilateral  Zay Tapia M.D.

 

 2019  H25.811  Combined forms of age-related cataract,  Zay Tapia M.D.



     right eye  

 

 2019  H40.1131  Primary open-angle glaucoma, bilateral,  Zay Tapia M.D.



     mild stage  

 

 2019  H40.033  Anatomical narrow angle, bilateral  Zay Tapia M.D.

 

 2019  H25.813  Combined forms of age-related cataract,  Zay Tapia M.D.



     bilateral  

 

 2019  H40.1131  Primary open-angle glaucoma, bilateral,  Zay Tapia M.D.



     mild stage  

 

 2019  H25.813  Combined forms of age-related cataract,  Zay Tapia M.D.



     bilateral  







Plan of Treatment

Future Appointment(s):2020  8:45 am - Zay Tapia M.D. at [facility], 2019 - Zay Tapia M.D.H16.143 Punctate keratitis, 
bilateralComments:Smoking can increase the risk of developing or worsening any 
eye related disease, as well as affect your overall health.  If you are a smoker
, we strongly recommend that you quit.If you are not a smoker, we strongly 
recommend that you do not start.BEGIN TAKING 2000MG OF OMEGA 3'S DAILYCONTINUE 
HOT COMPRESSES, REFRESH PM AT NIGHT AND ARTIFICIAL TEARS FREQUENTLY THROUGHOUT 
THE DAYH40.1131 Primary open-angle glaucoma, bilateral, mild stageComments:Your 
glaucoma is stable at this time.Your eye pressure is within an acceptable range
, and your testing does not show any further deterioration at this time. Please 
continue your treatment.Follow up:1 Month Follow Up IOP Check   At your next 
visit, we are not planning to dilate your eyes. However, if you have any 
changes in your vision or new symptoms, there are certain situations that 
require us to dilate your eyes. If Dr. Tapia requests any additional testing, 
that may require extra time. If you have any questions before your next 
appointment, please call our office at [phone].z96.3 Presence of 
intraocular lensComments:The artificial lens implants in both eyes appear to be 
stable at this time.



Functional Status







 Description

 

 No Information Available







Mental Status







 Description

 

 No Information Available







Referrals







 Description

 

 No Information Available